# Patient Record
Sex: MALE | Race: WHITE
[De-identification: names, ages, dates, MRNs, and addresses within clinical notes are randomized per-mention and may not be internally consistent; named-entity substitution may affect disease eponyms.]

---

## 2017-06-18 NOTE — EDM.PDOC
ED HPI GENERAL MEDICAL PROBLEM





- General


Stated Complaint: POSSIBLE STROKE


Time Seen by Provider: 06/18/17 10:19


Source of Information: Reports: Patient (partly from patient and partly from 

his wife), Family


History Limitations: Reports: Altered Mental Status





- History of Present Illness


INITIAL COMMENTS - FREE TEXT/NARRATIVE: 





62 yo gentleman with h/o Chronic ETOH abuse and dependency, Chronic smoking hx 

as well as other listed medical problems. Brought to the ER by his wife with h/

o acute confusional state. Wife reports that for the past 3 days, he has been 

drinking heavily. Wife reports that around 4 am when he went to the bathom, 

he apparently Fell down and passed. Wife is unsure what exactly happened to 

him. She heard him Fall and when she went to check on him, he was on the ground 

and was quite confused. He has remained confused since then and seems to be 

getting worse. No obvious fever, chills, SOB, chest pain. No urinary symptoms. 

[place] confusional state.


Onset: Today


Duration: Day(s): (occurred 5 hours ago), Getting Worse


Severity: Severe


Worsens with: Reports: None


Associated Symptoms: Reports: No Other Symptoms





- Related Data


 Allergies











Allergy/AdvReac Type Severity Reaction Status Date / Time


 


morphine Allergy  Difficulty Verified 05/09/16 13:13





   Breathing  


 


nicotine [From Nicoderm CQ] Allergy  Rash Verified 05/09/16 13:13


 


varenicline tartrate Allergy  Hives Verified 05/09/16 13:13





[From Chantix]     











Home Meds: 


 Home Meds





Carvedilol 50 mg PO BID 11/30/13 [History]


Lisinopril/Hydrochlorothiazide [Lisinopril-Hctz 20-12.5 mg Tab] 0.5 each PO 

DAILY 11/30/13 [History]


Nitroglycerin [Nitrostat] 0.4 mg SL ASDIRECTED PRN 11/30/13 [History]


metFORMIN [Glucophage] 1,000 mg PO BIDM 11/30/13 [History]


atorvaSTATin [Lipitor] 20 mg PO DAILY 12/19/14 [History]


Aspirin/Calcium Carbonate/Mag [Aspirin Buffered 325 mg Tab] 325 mg PO DAILY #30 

tablet 12/20/14 [Rx]


Beclomethasone Dipropionate [Qvar 40 Mcg] 2 puff BID PRN 01/26/16 [History]


Furosemide [Lasix] 20 mg PO ASDIRECTED PRN 01/26/16 [History]


Glimepiride [Glimepiride] 4 mg DAILY 01/26/16 [History]


Warfarin [Coumadin] 5 mg PO DAILY 01/26/16 [History]


Clindamycin Hcl [IJP: Clindamycin] 150 mg PO .EVERY 8 HOURS #30 cap 05/09/16 [Rx

]


Isosorbide Mononitrate [Imdur] 30 mg PO DAILY #30 tab.er 05/09/16 [Rx]


Nicotine Polacrilex [Nicorette] 4 mg BUCCAL ASDIRECTED #100 lozenge 05/09/16 [Rx

]


Prednisone [IJD: Prednisone] 10 mg PO BID #30 tab 05/09/16 [Rx]











Past Medical History


HEENT History: Reports: Impaired Vision


Cardiovascular History: Reports: Afib, Heart Failure, High Cholesterol, 

Hypertension, MI, Stents


Other Cardiovascular History: 3 STENTS, MI x 2, cardioversion x 2


Respiratory History: Reports: COPD


Musculoskeletal History: Reports: Back Pain, Chronic


Neurological History: Reports: Neuropathy, Diabetic


Psychiatric History: Reports: Addiction


Other Psychiatric History: hx ETOH abuse, has been hospitalized for ETOH abuse


Endocrine/Metabolic History: Reports: Diabetes, Type II





- Infectious Disease History


Infectious Disease History: Reports: Chicken Pox, Measles





- Past Surgical History


Neurological Surgical History: Reports: Spinal Fusion


Musculoskeletal Surgical History: Reports: Other (See Below)





Social & Family History





- Tobacco Use


Smoking Status *Q: Current Every Day Smoker


Years of Tobacco use: 50


Packs/Tins Daily: 1


Used Tobacco, but Quit: No


Second Hand Smoke Exposure: Yes





- Alcohol Use


Days Per Week of Alcohol Use: 7


Number of Drinks Per Day: 12


Total Drinks Per Week: 84





- Recreational Drug Use


Recreational Drug Use: No





ED ROS GENERAL





- Review of Systems


Review Of Systems: ROS reveals no pertinent complaints other than HPI.





- Physical Exam


Exam: See Below


Exam Limited By: Altered Mental Status


General Appearance: Lethargic, Other (confused.)


Eye Exam: Bilateral Eye: EOMI


Ears: Normal External Exam, Normal Canal, Hearing Grossly Normal, Normal TMs


Nose: Normal Inspection, Normal Mucosa


Throat/Mouth: Normal Inspection, Normal Lips, Normal Teeth, Normal Gums, Normal 

Oropharynx, Normal Voice, No Airway Compromise


Head Exam: Atraumatic, Normocephalic


Neck: Normal Inspection, Supple, Non-Tender


Respiratory/Chest: Other (decreased air entry bilaterally)


Cardiovascular: Normal Peripheral Pulses, Regular Rate, Rhythm, No JVD, No 

Murmur, Other (Bilateral Lower extremity edema)


GI/Abdominal: Normal Bowel Sounds, Soft, Non-Tender, No Organomegaly, No 

Distention, No Abnormal Bruit


Neuro Exam (Abbreviated): Alert, Oriented, CN II-XII Intact, Normal Cognition


Extremities: Other (Bilateral Lower extremity edema)


Skin Exam: Warm, Dry, No Rash





Course





- Vital Signs


Text/Narrative:: 





D/w  Dr. Garcia ([facility]) - who graciously accepted patient. He was 

transported in stable condition via Ambulance to [place]


Last Recorded V/S: 


 Last Vital Signs











Temp  37.0 C   06/18/17 10:10


 


Pulse  88   06/18/17 10:10


 


Resp  18   06/18/17 10:10


 


BP  156/72 H  06/18/17 10:10


 


Pulse Ox  98   06/18/17 10:10














- Orders/Labs/Meds


Orders: 


 Active Orders 24 hr











 Category Date Time Status


 


 EKG Documentation Completion [RC] ASDIRECTED Care  06/18/17 10:27 Active


 


 CXR [Chest 1V Frontal] [CR] Stat Exams  06/18/17 10:23 Ordered


 


 Head wo Cont [CT] Stat Exams  06/18/17 10:20 Taken


 


 Magnesium Sulfate [Magnesium Sulfate 50%] 2 gm Med  06/18/17 11:09 Active





 Dextrose 5% in Water 100 ml   





 IV ONETIME   


 


 Magnesium Sulfate/Water [Magnesium Sulfate 2 GM in Med  06/18/17 11:21 Active





 Water 50 ML] 2 gm   





 Premix Bag 1 bag   





 IV ONETIME   


 


 Sodium Chloride 0.9% [Normal Saline] 1,000 ml Med  06/18/17 10:30 Active





 IV ASDIRECTED   


 


 EKG 12 Lead [EK] Routine Ther  06/18/17 10:26 Ordered








 Medication Orders





Sodium Chloride (Normal Saline)  1,000 mls @ 100 mls/hr IV ASDIRECTED [facility]


   Last Admin: 06/18/17 10:59  Dose: 100 mls/hr


Magnesium Sulfate 2 gm/ (Dextrose/Water)  104 mls @ 100 mls/hr IV ONETIME ONE


   Stop: 06/18/17 12:11


   Last Admin: 06/18/17 11:24  Dose:  


Magnesium Sulfate 2 gm/ Premix  50 mls @ 150 mls/hr IV ONETIME ONE


   Stop: 06/18/17 11:40


   Last Admin: 06/18/17 11:31  Dose: 150 mls/hr








Labs: 


 Laboratory Tests











  06/18/17 06/18/17 06/18/17 Range/Units





  10:20 10:20 10:20 


 


WBC  13.9 H    (4.5-12.0)  X10-3/uL


 


RBC  4.19 L    (4.30-5.75)  x10(6)uL


 


Hgb  13.1    (11.5-15.5)  g/dL


 


Hct  37.9    (30.0-51.3)  %


 


MCV  90.6    (80-96)  fL


 


MCH  31.3    (27.7-33.6)  pg


 


MCHC  34.6    (32.2-35.4)  g/dL


 


RDW  11.9    (11.5-15.5)  %


 


Plt Count  284    (125-369)  X10(3)uL


 


MPV  6.5 L    (7.4-10.4)  fL


 


Neut % (Auto)  85.7 H    (46-82)  %


 


Lymph % (Auto)  5.8 L    (13-37)  %


 


Mono % (Auto)  8.3    (4-12)  %


 


Eos % (Auto)  0 L    (1.0-5.0)  %


 


Baso % (Auto)  0    (0-2)  %


 


Neut # (Auto)  11.9 H    (1.6-8.3)  #


 


Lymph # (Auto)  0.8    (0.6-5.0)  #


 


Mono # (Auto)  1.2    (0.0-1.3)  #


 


Eos # (Auto)  0.0    (0.0-0.8)  #


 


Baso # (Auto)  0.0    (0.0-0.2)  #


 


Sodium   104 L* D   (135-145)  mmol/L


 


Potassium   4.8   (3.5-5.3)  mmol/L


 


Chloride   71 L* D   (100-110)  mmol/L


 


Carbon Dioxide   15 L   (23-29)  mmol/L


 


BUN   8   (8-23)  mg/dL


 


Creatinine   0.7   (0.6-1.3)  mg/dL


 


Est Cr Clr Drug Dosing   115.04   mL/min


 


Estimated GFR (MDRD)   > 60   (>60)  


 


BUN/Creatinine Ratio   11.4   (9-20)  


 


Glucose   214 H   ()  mg/dL


 


Calcium   8.0 L   (8.6-10.2)  mg/dL


 


Magnesium     (1.8-2.5)  mg/dL


 


Total Bilirubin   2.3 H   (0.1-1.3)  mg/dL


 


AST   34 H   (5-27)  IU/L


 


ALT   23  D   (14-26)  IU/L


 


Alkaline Phosphatase   62   ()  IU/L


 


Troponin I     (0.02-0.06)  NG/ML


 


B-Natriuretic Peptide     (0-100)  pg/mL


 


Total Protein   7.1   (6.0-8.0)  g/dL


 


Albumin   4.5   (3.2-4.6)  g/dL


 


Globulin   2.6   g/dL


 


Albumin/Globulin Ratio   1.7   


 


TSH, Ultra Sensitive     (0.4-5.5)  nlU/mL


 


Urine Color     (YELLOW)  


 


Urine Appearance     (CLEAR)  


 


Urine pH     (5.0-6.5)  


 


Ur Specific Gravity     (1.010-1.025)  


 


Urine Protein     (NEGATIVE)  mg/dL


 


Urine Glucose (UA)     (NEGATIVE)  mg/dL


 


Urine Ketones     (NEGATIVE)  mg/dL


 


Urine Occult Blood     (NEGATIVE)  


 


Urine Nitrite     (NEGATIVE)  


 


Urine Bilirubin     (NEGATIVE)  


 


Urine Urobilinogen     (NEGATIVE)  mg/dL


 


Ur Leukocyte Esterase     (NEGATIVE)  


 


Urine Opiates Screen     (NEGATIVE)  


 


Ur Oxycodone Screen     (NEGATIVE)  


 


Ur Propoxyphene Screen     (NEGATIVE)  


 


Ur Barbituates Screen     (NEGATIVE)  


 


Ur Tricyclics Screen     (NEGATIVE)  


 


Ur Phencyclidine Scrn     (NEGATIVE)  


 


Ur Amphetamine Screen     (NEGATIVE)  


 


Urine MDMA Screen     (NEGATIVE)  


 


U Benzodiazepines Scrn     (NEGATIVE)  


 


U Cocaine Metab Screen     (NEGATIVE)  


 


U Marijuana (THC) Screen     (NEGATIVE)  


 


Ethyl Alcohol    < 0.01  (<0.01)  %














  06/18/17 06/18/17 06/18/17 Range/Units





  10:20 10:20 10:20 


 


WBC     (4.5-12.0)  X10-3/uL


 


RBC     (4.30-5.75)  x10(6)uL


 


Hgb     (11.5-15.5)  g/dL


 


Hct     (30.0-51.3)  %


 


MCV     (80-96)  fL


 


MCH     (27.7-33.6)  pg


 


MCHC     (32.2-35.4)  g/dL


 


RDW     (11.5-15.5)  %


 


Plt Count     (125-369)  X10(3)uL


 


MPV     (7.4-10.4)  fL


 


Neut % (Auto)     (46-82)  %


 


Lymph % (Auto)     (13-37)  %


 


Mono % (Auto)     (4-12)  %


 


Eos % (Auto)     (1.0-5.0)  %


 


Baso % (Auto)     (0-2)  %


 


Neut # (Auto)     (1.6-8.3)  #


 


Lymph # (Auto)     (0.6-5.0)  #


 


Mono # (Auto)     (0.0-1.3)  #


 


Eos # (Auto)     (0.0-0.8)  #


 


Baso # (Auto)     (0.0-0.2)  #


 


Sodium     (135-145)  mmol/L


 


Potassium     (3.5-5.3)  mmol/L


 


Chloride     (100-110)  mmol/L


 


Carbon Dioxide     (23-29)  mmol/L


 


BUN     (8-23)  mg/dL


 


Creatinine     (0.6-1.3)  mg/dL


 


Est Cr Clr Drug Dosing     mL/min


 


Estimated GFR (MDRD)     (>60)  


 


BUN/Creatinine Ratio     (9-20)  


 


Glucose     ()  mg/dL


 


Calcium     (8.6-10.2)  mg/dL


 


Magnesium  1.6 L    (1.8-2.5)  mg/dL


 


Total Bilirubin     (0.1-1.3)  mg/dL


 


AST     (5-27)  IU/L


 


ALT     (14-26)  IU/L


 


Alkaline Phosphatase     ()  IU/L


 


Troponin I   < 0.01 L   (0.02-0.06)  NG/ML


 


B-Natriuretic Peptide     (0-100)  pg/mL


 


Total Protein     (6.0-8.0)  g/dL


 


Albumin     (3.2-4.6)  g/dL


 


Globulin     g/dL


 


Albumin/Globulin Ratio     


 


TSH, Ultra Sensitive    0.42  (0.4-5.5)  nlU/mL


 


Urine Color     (YELLOW)  


 


Urine Appearance     (CLEAR)  


 


Urine pH     (5.0-6.5)  


 


Ur Specific Gravity     (1.010-1.025)  


 


Urine Protein     (NEGATIVE)  mg/dL


 


Urine Glucose (UA)     (NEGATIVE)  mg/dL


 


Urine Ketones     (NEGATIVE)  mg/dL


 


Urine Occult Blood     (NEGATIVE)  


 


Urine Nitrite     (NEGATIVE)  


 


Urine Bilirubin     (NEGATIVE)  


 


Urine Urobilinogen     (NEGATIVE)  mg/dL


 


Ur Leukocyte Esterase     (NEGATIVE)  


 


Urine Opiates Screen     (NEGATIVE)  


 


Ur Oxycodone Screen     (NEGATIVE)  


 


Ur Propoxyphene Screen     (NEGATIVE)  


 


Ur Barbituates Screen     (NEGATIVE)  


 


Ur Tricyclics Screen     (NEGATIVE)  


 


Ur Phencyclidine Scrn     (NEGATIVE)  


 


Ur Amphetamine Screen     (NEGATIVE)  


 


Urine MDMA Screen     (NEGATIVE)  


 


U Benzodiazepines Scrn     (NEGATIVE)  


 


U Cocaine Metab Screen     (NEGATIVE)  


 


U Marijuana (THC) Screen     (NEGATIVE)  


 


Ethyl Alcohol     (<0.01)  %














  06/18/17 06/18/17 06/18/17 Range/Units





  10:20 11:09 11:09 


 


WBC     (4.5-12.0)  X10-3/uL


 


RBC     (4.30-5.75)  x10(6)uL


 


Hgb     (11.5-15.5)  g/dL


 


Hct     (30.0-51.3)  %


 


MCV     (80-96)  fL


 


MCH     (27.7-33.6)  pg


 


MCHC     (32.2-35.4)  g/dL


 


RDW     (11.5-15.5)  %


 


Plt Count     (125-369)  X10(3)uL


 


MPV     (7.4-10.4)  fL


 


Neut % (Auto)     (46-82)  %


 


Lymph % (Auto)     (13-37)  %


 


Mono % (Auto)     (4-12)  %


 


Eos % (Auto)     (1.0-5.0)  %


 


Baso % (Auto)     (0-2)  %


 


Neut # (Auto)     (1.6-8.3)  #


 


Lymph # (Auto)     (0.6-5.0)  #


 


Mono # (Auto)     (0.0-1.3)  #


 


Eos # (Auto)     (0.0-0.8)  #


 


Baso # (Auto)     (0.0-0.2)  #


 


Sodium     (135-145)  mmol/L


 


Potassium     (3.5-5.3)  mmol/L


 


Chloride     (100-110)  mmol/L


 


Carbon Dioxide     (23-29)  mmol/L


 


BUN     (8-23)  mg/dL


 


Creatinine     (0.6-1.3)  mg/dL


 


Est Cr Clr Drug Dosing     mL/min


 


Estimated GFR (MDRD)     (>60)  


 


BUN/Creatinine Ratio     (9-20)  


 


Glucose     ()  mg/dL


 


Calcium     (8.6-10.2)  mg/dL


 


Magnesium     (1.8-2.5)  mg/dL


 


Total Bilirubin     (0.1-1.3)  mg/dL


 


AST     (5-27)  IU/L


 


ALT     (14-26)  IU/L


 


Alkaline Phosphatase     ()  IU/L


 


Troponin I     (0.02-0.06)  NG/ML


 


B-Natriuretic Peptide  107 H    (0-100)  pg/mL


 


Total Protein     (6.0-8.0)  g/dL


 


Albumin     (3.2-4.6)  g/dL


 


Globulin     g/dL


 


Albumin/Globulin Ratio     


 


TSH, Ultra Sensitive     (0.4-5.5)  nlU/mL


 


Urine Color    Yellow  (YELLOW)  


 


Urine Appearance    Clear  (CLEAR)  


 


Urine pH    5.0  (5.0-6.5)  


 


Ur Specific Gravity    1.020  (1.010-1.025)  


 


Urine Protein    Trace  (NEGATIVE)  mg/dL


 


Urine Glucose (UA)    >1000 H  (NEGATIVE)  mg/dL


 


Urine Ketones    150 H  (NEGATIVE)  mg/dL


 


Urine Occult Blood    Moderate H  (NEGATIVE)  


 


Urine Nitrite    Negative  (NEGATIVE)  


 


Urine Bilirubin    Negative  (NEGATIVE)  


 


Urine Urobilinogen    Normal  (NEGATIVE)  mg/dL


 


Ur Leukocyte Esterase    Negative  (NEGATIVE)  


 


Urine Opiates Screen   Negative   (NEGATIVE)  


 


Ur Oxycodone Screen   Negative   (NEGATIVE)  


 


Ur Propoxyphene Screen   Negative   (NEGATIVE)  


 


Ur Barbituates Screen   Negative   (NEGATIVE)  


 


Ur Tricyclics Screen   Negative   (NEGATIVE)  


 


Ur Phencyclidine Scrn   Negative   (NEGATIVE)  


 


Ur Amphetamine Screen   Negative   (NEGATIVE)  


 


Urine MDMA Screen   Negative   (NEGATIVE)  


 


U Benzodiazepines Scrn   Positive H   (NEGATIVE)  


 


U Cocaine Metab Screen   Negative   (NEGATIVE)  


 


U Marijuana (THC) Screen   Negative   (NEGATIVE)  


 


Ethyl Alcohol     (<0.01)  %











Meds: 


Medications











Generic Name Dose Route Start Last Admin





  Trade Name Freq  PRN Reason Stop Dose Admin


 


Sodium Chloride  1,000 mls @ 100 mls/hr  06/18/17 10:30  06/18/17 10:59





  Normal Saline  IV   100 mls/hr





  ASDIRECTED WILIAN   Administration


 


Magnesium Sulfate 2 gm/  104 mls @ 100 mls/hr  06/18/17 11:09  06/18/17 11:24





  Dextrose/Water  IV  06/18/17 12:11  Not Given





  ONETIME ONE   


 


Magnesium Sulfate 2 gm/ Premix  50 mls @ 150 mls/hr  06/18/17 11:21  06/18/17 11

:31





  IV  06/18/17 11:40  150 mls/hr





  ONETIME ONE   Administration














Discontinued Medications














Generic Name Dose Route Start Last Admin





  Trade Name Shanika  PRN Reason Stop Dose Admin


 


Magnesium Sulfate  Confirm  06/18/17 11:18  06/18/17 11:24





  Magnesium Sulfate 2 Gm In Water 50 Ml  Administered  06/18/17 11:19  Not Given





  Dose   





  50 mls @ as directed   





  .ROUTE   





  .STK-MED ONE   














Departure





- Departure


Time of Disposition: 11:41


Disposition: DC/Tfer to Other 70


Condition: Fair


Clinical Impression: 


 Acute hyponatremia








- Discharge Information


Instructions:  Hyponatremia


Referrals: 


Wilson Burnette MD [Primary Care Provider] - 


Forms:  ED Department Discharge


Additional Instructions: 


Transferred to 





- My Orders


Last 24 Hours: 


My Active Orders





06/18/17 10:20


Head wo Cont [CT] Stat 





06/18/17 10:23


CXR [Chest 1V Frontal] [CR] Stat 





06/18/17 10:26


EKG 12 Lead [EK] Routine 





06/18/17 10:27


EKG Documentation Completion [RC] ASDIRECTED 





06/18/17 10:30


Sodium Chloride 0.9% [Normal Saline] 1,000 ml IV ASDIRECTED 





06/18/17 11:09


Magnesium Sulfate [Magnesium Sulfate 50%] 2 gm   Dextrose 5% in Water 100 ml IV 

ONETIME 





06/18/17 11:21


Magnesium Sulfate/Water [Magnesium Sulfate 2 GM in Water 50 ML] 2 gm   Premix 

Bag 1 bag IV ONETIME 














- Assessment/Plan


Last 24 Hours: 


My Active Orders





06/18/17 10:20


Head wo Cont [CT] Stat 





06/18/17 10:23


CXR [Chest 1V Frontal] [CR] Stat 





06/18/17 10:26


EKG 12 Lead [EK] Routine 





06/18/17 10:27


EKG Documentation Completion [RC] ASDIRECTED 





06/18/17 10:30


Sodium Chloride 0.9% [Normal Saline] 1,000 ml IV ASDIRECTED 





06/18/17 11:09


Magnesium Sulfate [Magnesium Sulfate 50%] 2 gm   Dextrose 5% in Water 100 ml IV 

ONETIME 





06/18/17 11:21


Magnesium Sulfate/Water [Magnesium Sulfate 2 GM in Water 50 ML] 2 gm   Premix 

Bag 1 bag IV ONETIME

## 2017-06-22 NOTE — CR
INDICATION:  Altered mental status.  



CHEST, AP VIEW: 



FINDINGS:  There is an earlier chest x-ray of 05/09/2016 for correlation. 



I do not necessarily see an interval change.  The lungs and pleural spaces are 
clear.  The heart size is at the upper limits of normal.  There is no CHF.  For 
the AP technique, I feel the mediastinal contours are normal.  There is mild 
scoliosis deformity mid thoracic spine curved convex left, as before.  There is 
some superimposed degenerative endplate change and spurring.  



IMPRESSION:  No acute abnormality in the chest.  Specifically, no CHF or 
pneumonia.  
MTDD

## 2019-10-13 ENCOUNTER — HOSPITAL ENCOUNTER (EMERGENCY)
Dept: HOSPITAL 7 - FB.ED | Age: 64
LOS: 1 days | Discharge: NURSING, REHAB OR LONG-TERM CARE | End: 2019-10-14
Payer: MEDICARE

## 2019-10-13 DIAGNOSIS — Z91.048: ICD-10-CM

## 2019-10-13 DIAGNOSIS — Z79.899: ICD-10-CM

## 2019-10-13 DIAGNOSIS — I50.9: ICD-10-CM

## 2019-10-13 DIAGNOSIS — Z79.4: ICD-10-CM

## 2019-10-13 DIAGNOSIS — J44.9: ICD-10-CM

## 2019-10-13 DIAGNOSIS — J96.01: Primary | ICD-10-CM

## 2019-10-13 DIAGNOSIS — Z79.51: ICD-10-CM

## 2019-10-13 DIAGNOSIS — I25.2: ICD-10-CM

## 2019-10-13 DIAGNOSIS — Z88.5: ICD-10-CM

## 2019-10-13 DIAGNOSIS — F17.200: ICD-10-CM

## 2019-10-13 DIAGNOSIS — R79.1: ICD-10-CM

## 2019-10-13 DIAGNOSIS — I11.0: ICD-10-CM

## 2019-10-13 DIAGNOSIS — Z79.01: ICD-10-CM

## 2019-10-13 DIAGNOSIS — Z95.5: ICD-10-CM

## 2019-10-13 DIAGNOSIS — Z79.82: ICD-10-CM

## 2019-10-13 DIAGNOSIS — E11.40: ICD-10-CM

## 2019-10-13 DIAGNOSIS — I48.19: ICD-10-CM

## 2019-10-13 DIAGNOSIS — E78.00: ICD-10-CM

## 2019-10-13 PROCEDURE — 85610 PROTHROMBIN TIME: CPT

## 2019-10-13 PROCEDURE — 83880 ASSAY OF NATRIURETIC PEPTIDE: CPT

## 2019-10-13 PROCEDURE — 36415 COLL VENOUS BLD VENIPUNCTURE: CPT

## 2019-10-13 PROCEDURE — 85025 COMPLETE CBC W/AUTO DIFF WBC: CPT

## 2019-10-13 PROCEDURE — 82803 BLOOD GASES ANY COMBINATION: CPT

## 2019-10-13 PROCEDURE — 99285 EMERGENCY DEPT VISIT HI MDM: CPT

## 2019-10-13 PROCEDURE — 93005 ELECTROCARDIOGRAM TRACING: CPT

## 2019-10-13 PROCEDURE — 80053 COMPREHEN METABOLIC PANEL: CPT

## 2019-10-13 PROCEDURE — 86140 C-REACTIVE PROTEIN: CPT

## 2019-10-13 PROCEDURE — 96374 THER/PROPH/DIAG INJ IV PUSH: CPT

## 2019-10-13 PROCEDURE — 71045 X-RAY EXAM CHEST 1 VIEW: CPT

## 2019-10-13 PROCEDURE — 85379 FIBRIN DEGRADATION QUANT: CPT

## 2019-10-13 PROCEDURE — 83735 ASSAY OF MAGNESIUM: CPT

## 2019-10-13 PROCEDURE — 84484 ASSAY OF TROPONIN QUANT: CPT

## 2019-10-13 PROCEDURE — 94640 AIRWAY INHALATION TREATMENT: CPT

## 2019-10-14 VITALS — DIASTOLIC BLOOD PRESSURE: 91 MMHG | SYSTOLIC BLOOD PRESSURE: 167 MMHG | HEART RATE: 96 BPM

## 2019-10-14 NOTE — EDM.PDOC
ED HPI GENERAL MEDICAL PROBLEM





- General


Stated Complaint: SHORT OF BREATH; LEGS SWELLING


Time Seen by Provider: 10/14/19 00:04


Source of Information: Reports: Patient


History Limitations: Reports: No Limitations





- History of Present Illness


INITIAL COMMENTS - FREE TEXT/NARRATIVE: 





64-year-old male who reports the beginning Friday evening he noticed that he 

was having some more wreathing with shortness of breath at rest while watching 

TV. He also began to notice that his feet had increased swelling. The symptoms 

seem to be present all day long yesterday and then this morning because his 

feet were still swollen and his breathing was somewhat worse than the day before

, he took 20 mg of Lasix by mouth. This is appear in medication that he has on 

hand and he has not had to take it for quite some time. The medication is 

current as he keeps it fresh he states. He had quite a bit of urine output 

today he feels and it seemed to be improving but the swelling in his feet seem 

to be worse tonight and at 10:30 PM he was having increased difficulty 

breathing and that caused him to present here for evaluation. He did not feel 

he can catch his breath tonight. He had no chest pain associated with this. 

There is a burning-type pressure sensation in both of his feet and lower legs 

that has been worsening now they're swollen. He rates that pain is about an 8/

10. He does have some chronic neuropathy but this is worse. No nausea or 

vomiting. No fevers or chills. No new cough. No phlegm production. He has been 

taking PO well today. No arm pain, back pain or jaw pain. When he showed appear 

his O2 saturations were 87% on room air and they bumped up to 95% on 1 L/m via 

nasal cannula. He is on no chronic oxygen therapy. There are no other 

associated signs or symptoms. There are no other modifying factors.


Onset: Other (Friday night, 10/11/2019.)


Duration: Getting Worse


Location: Reports: Lower Extremity, Left, Lower Extremity, Right


Quality: Reports: Burning, Pressure, Other (And shortness of breath, that is 

his major complaint when presenting tonight)


Severity: Moderate


Improves with: Reports: None


Worsens with: Reports: None


Context: Reports: Other (As above)


Associated Symptoms: Reports: No Other Symptoms (Just the shortness of breath)


Treatments PTA: Reports: Other (see below) (Lasix 20 mg at 10:30 AM on 10/13/

2019.)





- Related Data


 Allergies











Allergy/AdvReac Type Severity Reaction Status Date / Time


 


morphine Allergy  Difficulty Verified 05/09/16 13:13





   Breathing  


 


nicotine [From Nicoderm CQ] Allergy  Rash Verified 05/09/16 13:13


 


varenicline tartrate Allergy  Hives Verified 05/09/16 13:13





[From Chantix]     











Home Meds: 


 Home Meds





Carvedilol 25 mg PO BID 11/30/13 [History]


Lisinopril/Hydrochlorothiazide [Lisinopril-Hctz 20-12.5 mg Tab] 1 tab PO DAILY 

11/30/13 [History]


Nitroglycerin [Nitrostat] 0.4 mg SL ASDIRECTED PRN 11/30/13 [History]


metFORMIN [Glucophage] 1,000 mg PO BIDM 11/30/13 [History]


atorvaSTATin [Lipitor] 20 mg PO DAILY 12/19/14 [History]


Furosemide [Lasix] 20 mg PO ASDIRECTED PRN 01/26/16 [History]


Glimepiride 4 mg DAILY 01/26/16 [History]


Warfarin [Coumadin] 5 mg PO SUTUTHSA 01/26/16 [History]


Isosorbide Mononitrate [Imdur] 30 mg PO DAILY #30 tab.er 05/09/16 [Rx]


.Vitamin K-1 1 tab PO DAILY 10/14/19 [History]


Aspirin [Ecotrin EC] 81 mg PO DAILY 10/14/19 [History]


Docusate Sodium [Stool Softener] 100 mg PO DAILY 10/14/19 [History]


Fluticasone/Vilanterol [Breo Ellipta 100-25 MCG Inhalation Kit] 1 puff INH 

DAILY 10/14/19 [History]


Insulin Degludec [Tresiba Flextouch U-100] 15 unit SQ DAILY 10/14/19 [History]


Multivit-Min/FA/Lycopene/Lut [Centrum Silver Tablet] 1 each PO DAILY 10/14/19 [

History]


Warfarin [Coumadin] 5 mg PO MOWEFR 10/14/19 [History]











Past Medical History


HEENT History: Reports: Impaired Vision


Cardiovascular History: Reports: Afib, Heart Failure, High Cholesterol, 

Hypertension, MI, Stents


Other Cardiovascular History: 3 STENTS, MI x 2, cardioversion x 2


Respiratory History: Reports: COPD


Musculoskeletal History: Reports: Back Pain, Chronic


Neurological History: Reports: Neuropathy, Diabetic


Psychiatric History: Reports: Addiction


Other Psychiatric History: hx ETOH abuse, has been hospitalized for ETOH abuse


Endocrine/Metabolic History: Reports: Diabetes, Type II





- Infectious Disease History


Infectious Disease History: Reports: Chicken Pox, Measles





- Past Surgical History


Cardiovascular Surgical History: Reports: Coronary Artery Stent, Percutaneous 

Transluminal Angioplasty (3)


Neurological Surgical History: Reports: Spinal Fusion, Other (See Below) (Back 

surgery 2. Left sciatic nerve surgery.)


Musculoskeletal Surgical History: Reports: Other (See Below)





Social & Family History





- Family History


Family Medical History: Noncontributory





- Tobacco Use


Smoking Status *Q: Current Every Day Smoker





- Caffeine Use


Caffeine Use: Reports: Coffee





- Alcohol Use


Alcohol Use History: Yes


Alcohol Use in Last Twelve Months: No


Alcohol Use Comment: History of alcoholism in the past but apparently has been 

sober for some time





- Living Situation & Occupation


Living situation: Reports: , with Spouse


Occupation: Retired (Retired )





ED ROS GENERAL





- Review of Systems


Review Of Systems: See Below


Constitutional: Reports: No Symptoms


HEENT: Reports: No Symptoms


Respiratory: Reports: Shortness of Breath


Cardiovascular: Reports: Dyspnea on Exertion, Edema, Orthopnea.  Denies: Chest 

Pain


GI/Abdominal: Reports: No Symptoms


: Reports: No Symptoms


Musculoskeletal: Reports: Leg Pain (Burning pain bilaterally)


Skin: Reports: No Symptoms


Neurological: Reports: No Symptoms


Hematologic/Lymphatic: Reports: Easy Bleeding, Easy Bruising, Other (Patient is 

chronically anticoagulated with Coumadin.)


Immunologic: Reports: Other (Got an influenza vaccine on Friday of this last 

week)





ED EXAM, GENERAL





- Physical Exam


Exam: See Below


Exam Limited By: No Limitations


General Appearance: Alert, WD/WN, Moderate Distress (Appear somewhat dyspneic)


Eye Exam: Bilateral Eye: EOMI, Normal Inspection, PERRL


Ears: Normal External Exam, Hearing Grossly Normal


Ear Exam: Bilateral Ear: Auricle Normal


Nose: Normal Inspection, Normal Mucosa, No Blood


Throat/Mouth: Normal Inspection, Normal Lips, Normal Oropharynx, Normal Voice, 

No Airway Compromise


Head: Atraumatic, Normocephalic


Neck: Normal Inspection, Supple, Non-Tender, Full Range of Motion


Respiratory/Chest: Chest Non-Tender, Respiratory Distress, Rales, Wheezing, 

Accessory Muscle Use


Cardiovascular: Normal Peripheral Pulses, No Murmur, JVD, Irregularly Irregular


Peripheral Pulses: 2+: Radial (L), Radial (R), Dorsalis Pedis (L), Dorsalis 

Pedis (R)


GI/Abdominal: Normal Bowel Sounds, Soft, Non-Tender, No Mass


Back Exam: Normal Inspection, Full Range of Motion


Extremities: Normal Capillary Refill, Pedal Edema


Neurological: Alert, Oriented, CN II-XII Intact, Normal Cognition, No Motor/

Sensory Deficits


Skin Exam: Warm, Dry, Intact, Normal Color, No Rash





EKG INTERPRETATION


EKG Date: 10/14/19


Time: 00:25


Rhythm: A-Fib


Rate (Beats/Min): 101


Axis: Normal


P-Wave: Absent


QRS: Wide


ST-T: Other (Nonspecific changes with poor R-wave progression)


QT: Normal


Comparison: No Change (From EKG performed on 6/18/2017.)





Course





- Orders/Labs/Meds


Orders: 


 Active Orders 24 hr











 Category Date Time Status


 


 EKG Documentation Completion [RC] ASDIRECTED Care  10/14/19 00:23 Active


 


 RT Aerosol Therapy [RC] ASDIRECTED Care  10/14/19 00:24 Active


 


 Chest 1V Frontal [CR] Stat Exams  10/14/19 00:22 Ordered


 


 Sodium Chloride 0.9% [Saline Flush] Med  10/14/19 00:22 Active





 10 ml FLUSH ASDIRECTED PRN   


 


 Peripheral IV Insertion Adult [OM.PC] Routine Oth  10/14/19 00:22 Ordered


 


 EKG 12 Lead [EK] Routine Ther  10/14/19 00:22 Ordered








 Medication Orders





Sodium Chloride (Saline Flush)  10 ml FLUSH ASDIRECTED PRN


   PRN Reason: Keep Vein Open


   Last Admin: 10/14/19 00:50  Dose: 10 ml








Labs: 


 Laboratory Tests











  10/14/19 10/14/19 10/14/19 Range/Units





  00:50 00:50 00:50 


 


WBC  10.2    (4.5-12.0)  X10-3/uL


 


RBC  4.42    (4.30-5.75)  x10(6)uL


 


Hgb  13.8    (13.5-17.8)  g/dL


 


Hct  40.5    (30.0-51.3)  %


 


MCV  91.6    (80-96)  fL


 


MCH  31.1    (27.7-33.6)  pg


 


MCHC  34.0    (32.2-35.4)  g/dL


 


RDW  12.7    (11.5-15.5)  %


 


Plt Count  236    (125-369)  X10(3)uL


 


MPV  6.9 L    (7.4-10.4)  fL


 


Neut % (Auto)  79.3    (46-82)  %


 


Lymph % (Auto)  10.2 L    (13-37)  %


 


Mono % (Auto)  9.6    (4-12)  %


 


Eos % (Auto)  1    (1.0-5.0)  %


 


Baso % (Auto)  0    (0-2)  %


 


Neut # (Auto)  8.1    (1.6-8.3)  #


 


Lymph # (Auto)  1.0    (0.6-5.0)  #


 


Mono # (Auto)  1.0    (0.0-1.3)  #


 


Eos # (Auto)  0.1    (0.0-0.8)  #


 


Baso # (Auto)  0.0    (0.0-0.2)  #


 


PT   52.9 H*   (8.7-11.1)  


 


INR   4.93 H*   (0.89-1.13)  


 


D-Dimer, Quantitative   < 0.19   (0.0-0.59)  mg/LFEU


 


POC VBG pH     (7.31-7.41)  


 


POC VBG pCO2     (41-51)  mmHG


 


POC VBG HCO3     (23-28)  mmol/L


 


POC VBG Total CO2     (24-29)  mmol/L


 


POC VBG Base Excess     (-2-3)  mmol/L


 


Sodium    136  (135-145)  mmol/L


 


Potassium    3.4 L  (3.5-5.3)  mmol/L


 


Chloride    97 L  (100-110)  mmol/L


 


Carbon Dioxide    31  (21-32)  mmol/L


 


BUN    3 L  (7-18)  mg/dL


 


Creatinine    0.5 L  (0.70-1.30)  mg/dL


 


Est Cr Clr Drug Dosing    154.78  mL/min


 


Estimated GFR (MDRD)    > 60  (>60)  


 


BUN/Creatinine Ratio    6.0 L  (9-20)  


 


Glucose    153 H  ()  mg/dL


 


Calcium    8.6  (8.6-10.2)  mg/dL


 


Magnesium     (1.8-2.5)  mg/dL


 


Total Bilirubin    0.6  (0.1-1.3)  mg/dL


 


AST    21  (5-25)  IU/L


 


ALT    40 H  (12-36)  U/L


 


Alkaline Phosphatase    88  ()  IU/L


 


Troponin I     (<0.017-0.056)  ng/mL


 


C-Reactive Protein     (0.5-0.9)  mg/dL


 


NT-Pro-B Natriuret Pep     (<=125)  pg/mL


 


Total Protein    6.6  (6.0-8.0)  g/dL


 


Albumin    3.2  (3.2-4.6)  g/dL


 


Globulin    3.4  g/dL


 


Albumin/Globulin Ratio    0.9  














  10/14/19 10/14/19 10/14/19 Range/Units





  00:50 00:50 00:50 


 


WBC     (4.5-12.0)  X10-3/uL


 


RBC     (4.30-5.75)  x10(6)uL


 


Hgb     (13.5-17.8)  g/dL


 


Hct     (30.0-51.3)  %


 


MCV     (80-96)  fL


 


MCH     (27.7-33.6)  pg


 


MCHC     (32.2-35.4)  g/dL


 


RDW     (11.5-15.5)  %


 


Plt Count     (125-369)  X10(3)uL


 


MPV     (7.4-10.4)  fL


 


Neut % (Auto)     (46-82)  %


 


Lymph % (Auto)     (13-37)  %


 


Mono % (Auto)     (4-12)  %


 


Eos % (Auto)     (1.0-5.0)  %


 


Baso % (Auto)     (0-2)  %


 


Neut # (Auto)     (1.6-8.3)  #


 


Lymph # (Auto)     (0.6-5.0)  #


 


Mono # (Auto)     (0.0-1.3)  #


 


Eos # (Auto)     (0.0-0.8)  #


 


Baso # (Auto)     (0.0-0.2)  #


 


PT     (8.7-11.1)  


 


INR     (0.89-1.13)  


 


D-Dimer, Quantitative     (0.0-0.59)  mg/LFEU


 


POC VBG pH     (7.31-7.41)  


 


POC VBG pCO2     (41-51)  mmHG


 


POC VBG HCO3     (23-28)  mmol/L


 


POC VBG Total CO2     (24-29)  mmol/L


 


POC VBG Base Excess     (-2-3)  mmol/L


 


Sodium     (135-145)  mmol/L


 


Potassium     (3.5-5.3)  mmol/L


 


Chloride     (100-110)  mmol/L


 


Carbon Dioxide     (21-32)  mmol/L


 


BUN     (7-18)  mg/dL


 


Creatinine     (0.70-1.30)  mg/dL


 


Est Cr Clr Drug Dosing     mL/min


 


Estimated GFR (MDRD)     (>60)  


 


BUN/Creatinine Ratio     (9-20)  


 


Glucose     ()  mg/dL


 


Calcium     (8.6-10.2)  mg/dL


 


Magnesium   1.6 L   (1.8-2.5)  mg/dL


 


Total Bilirubin     (0.1-1.3)  mg/dL


 


AST     (5-25)  IU/L


 


ALT     (12-36)  U/L


 


Alkaline Phosphatase     ()  IU/L


 


Troponin I  < 0.017 L    (<0.017-0.056)  ng/mL


 


C-Reactive Protein  7.0 H*    (0.5-0.9)  mg/dL


 


NT-Pro-B Natriuret Pep    750 H  (<=125)  pg/mL


 


Total Protein     (6.0-8.0)  g/dL


 


Albumin     (3.2-4.6)  g/dL


 


Globulin     g/dL


 


Albumin/Globulin Ratio     














  10/14/19 Range/Units





  00:50 


 


WBC   (4.5-12.0)  X10-3/uL


 


RBC   (4.30-5.75)  x10(6)uL


 


Hgb   (13.5-17.8)  g/dL


 


Hct   (30.0-51.3)  %


 


MCV   (80-96)  fL


 


MCH   (27.7-33.6)  pg


 


MCHC   (32.2-35.4)  g/dL


 


RDW   (11.5-15.5)  %


 


Plt Count   (125-369)  X10(3)uL


 


MPV   (7.4-10.4)  fL


 


Neut % (Auto)   (46-82)  %


 


Lymph % (Auto)   (13-37)  %


 


Mono % (Auto)   (4-12)  %


 


Eos % (Auto)   (1.0-5.0)  %


 


Baso % (Auto)   (0-2)  %


 


Neut # (Auto)   (1.6-8.3)  #


 


Lymph # (Auto)   (0.6-5.0)  #


 


Mono # (Auto)   (0.0-1.3)  #


 


Eos # (Auto)   (0.0-0.8)  #


 


Baso # (Auto)   (0.0-0.2)  #


 


PT   (8.7-11.1)  


 


INR   (0.89-1.13)  


 


D-Dimer, Quantitative   (0.0-0.59)  mg/LFEU


 


POC VBG pH  7.40  (7.31-7.41)  


 


POC VBG pCO2  47.3  (41-51)  mmHG


 


POC VBG HCO3  29.2 H  (23-28)  mmol/L


 


POC VBG Total CO2  31 H  (24-29)  mmol/L


 


POC VBG Base Excess  4 H  (-2-3)  mmol/L


 


Sodium   (135-145)  mmol/L


 


Potassium   (3.5-5.3)  mmol/L


 


Chloride   (100-110)  mmol/L


 


Carbon Dioxide   (21-32)  mmol/L


 


BUN   (7-18)  mg/dL


 


Creatinine   (0.70-1.30)  mg/dL


 


Est Cr Clr Drug Dosing   mL/min


 


Estimated GFR (MDRD)   (>60)  


 


BUN/Creatinine Ratio   (9-20)  


 


Glucose   ()  mg/dL


 


Calcium   (8.6-10.2)  mg/dL


 


Magnesium   (1.8-2.5)  mg/dL


 


Total Bilirubin   (0.1-1.3)  mg/dL


 


AST   (5-25)  IU/L


 


ALT   (12-36)  U/L


 


Alkaline Phosphatase   ()  IU/L


 


Troponin I   (<0.017-0.056)  ng/mL


 


C-Reactive Protein   (0.5-0.9)  mg/dL


 


NT-Pro-B Natriuret Pep   (<=125)  pg/mL


 


Total Protein   (6.0-8.0)  g/dL


 


Albumin   (3.2-4.6)  g/dL


 


Globulin   g/dL


 


Albumin/Globulin Ratio   











Meds: 


Medications











Generic Name Dose Route Start Last Admin





  Trade Name Freq  PRN Reason Stop Dose Admin


 


Sodium Chloride  10 ml  10/14/19 00:22  10/14/19 00:50





  Saline Flush  FLUSH   10 ml





  ASDIRECTED PRN   Administration





  Keep Vein Open   





     





     





     














Discontinued Medications














Generic Name Dose Route Start Last Admin





  Trade Name Eliasq  PRN Reason Stop Dose Admin


 


Albuterol/Ipratropium  3 ml  10/14/19 00:24  10/14/19 00:45





  Duoneb 3.0-0.5 Mg/3 Ml  NEB  10/14/19 00:25  3 ml





  ONETIME ONE   Administration





     





     





     





     


 


Furosemide  40 mg  10/14/19 00:24  10/14/19 00:46





  Lasix  IVPUSH  10/14/19 00:25  40 mg





  NOW ONE   Administration





     





     





     





     














- Radiology Interpretation


Free Text/Narrative:: 





Portable chest x-ray shows evidence of COPD but also evidence of decompensated 

CHF.





- Re-Assessments/Exams


Free Text/Narrative Re-Assessment/Exam: 





10/14/19 01:30: Patient remains stable with O2 saturations of 93-94% on 1 L per 

min via nasal cannula. He is having a good diuresis from the 40 mg of Lasix 

that was given. His chest x-ray shows evidence of CHF. His hemoglobin is 

normal. His electrolytes are essentially normal. I am awaiting his troponin, d-

dimer, INR and proBNP. The patient does have decompensated CHF with respiratory 

failure and will need admission IV diuresis. There are no beds available at the 

[facility] and the patient will therefore need to be transferred to a 

facility with a medical/telemetry bed available. The patient is followed 

through the [facility] and would want me to discuss this case with the 

doctors at [facility] in [place].





10/14/19 02:00: The patient's troponin is back and is negative. D-dimer is 

normal. His proBNP is somewhat elevated. INR is supratherapeutic at 4.93. I 

discussed the patient's case with Dr. Quiroga, hospitalist at [facility] in [place]

, and he has agreed to accept the patient in transfer. The patient will be 

transferred via ambulance to [facility] in [place] for direct admission. I have 

discussed this with the patient and his wife and they are in agreement with the 

plans for transfer.








Departure





- Departure


Time of Disposition: 02:30


Disposition: DC/Tfer to Acute Hospital 02


Condition: Fair (Guarded)


Clinical Impression: 


 Acute decompensated heart failure, Supratherapeutic INR





Respiratory failure with hypoxia


Qualifiers:


 Chronicity: acute Qualified Code(s): J96.01 - Acute respiratory failure with 

hypoxia





Atrial fibrillation


Qualifiers:


 Atrial fibrillation type: unspecified persistent Qualified Code(s): I48.19 - 

Other persistent atrial fibrillation; I48.1 - Persistent atrial fibrillation





COPD (chronic obstructive pulmonary disease)


Qualifiers:


 COPD type: unspecified COPD Qualified Code(s): J44.9 - Chronic obstructive 

pulmonary disease, unspecified








- Discharge Information


Referrals: 


Wilson Burnette MD [Primary Care Provider] - 





- My Orders


Last 24 Hours: 


My Active Orders





10/14/19 00:22


Chest 1V Frontal [CR] Stat 


Sodium Chloride 0.9% [Saline Flush]   10 ml FLUSH ASDIRECTED PRN 


Peripheral IV Insertion Adult [OM.PC] Routine 


EKG 12 Lead [EK] Routine 





10/14/19 00:23


EKG Documentation Completion [RC] ASDIRECTED 





10/14/19 00:24


RT Aerosol Therapy [RC] ASDIRECTED 














- Assessment/Plan


Last 24 Hours: 


My Active Orders





10/14/19 00:22


Chest 1V Frontal [CR] Stat 


Sodium Chloride 0.9% [Saline Flush]   10 ml FLUSH ASDIRECTED PRN 


Peripheral IV Insertion Adult [OM.PC] Routine 


EKG 12 Lead [EK] Routine 





10/14/19 00:23


EKG Documentation Completion [RC] ASDIRECTED 





10/14/19 00:24


RT Aerosol Therapy [RC] ASDIRECTED

## 2019-11-29 ENCOUNTER — HOSPITAL ENCOUNTER (EMERGENCY)
Dept: HOSPITAL 7 - FB.ED | Age: 64
Discharge: HOME | End: 2019-11-29
Payer: MEDICARE

## 2019-11-29 VITALS — HEART RATE: 96 BPM | DIASTOLIC BLOOD PRESSURE: 63 MMHG | SYSTOLIC BLOOD PRESSURE: 104 MMHG

## 2019-11-29 DIAGNOSIS — E11.65: Primary | ICD-10-CM

## 2019-11-29 DIAGNOSIS — F17.210: ICD-10-CM

## 2019-11-29 DIAGNOSIS — Z79.82: ICD-10-CM

## 2019-11-29 DIAGNOSIS — Z88.5: ICD-10-CM

## 2019-11-29 DIAGNOSIS — I11.0: ICD-10-CM

## 2019-11-29 DIAGNOSIS — I48.91: ICD-10-CM

## 2019-11-29 DIAGNOSIS — I10: ICD-10-CM

## 2019-11-29 DIAGNOSIS — Z79.899: ICD-10-CM

## 2019-11-29 DIAGNOSIS — Z79.51: ICD-10-CM

## 2019-11-29 DIAGNOSIS — Z88.8: ICD-10-CM

## 2019-11-29 DIAGNOSIS — Z79.4: ICD-10-CM

## 2019-11-29 DIAGNOSIS — I25.2: ICD-10-CM

## 2019-11-29 DIAGNOSIS — I50.9: ICD-10-CM

## 2019-11-29 DIAGNOSIS — J44.9: ICD-10-CM

## 2019-11-29 PROCEDURE — 82962 GLUCOSE BLOOD TEST: CPT

## 2019-11-29 PROCEDURE — 99284 EMERGENCY DEPT VISIT MOD MDM: CPT

## 2019-11-29 NOTE — EDM.PDOC
ED HPI GENERAL MEDICAL PROBLEM





- General


Chief Complaint: Diabetic Complaint


Stated Complaint: HIGH BLOOD SUGAR


Time Seen by Provider: 11/29/19 19:10


Source of Information: Reports: Patient


History Limitations: Reports: No Limitations





- History of Present Illness


INITIAL COMMENTS - FREE TEXT/NARRATIVE: 





Patient presented to the ED because of high blood sugar. He is taking 

prednisone for the past 4 days and since then his BS has been more than 250's. 

His doctor adjusted his insulin degludec from 15 U SC daily to 30 U SC but 

still his BS is elevated. His reading tonight was more than 450. He c/o mild 

headache.





- Related Data


 Allergies











Allergy/AdvReac Type Severity Reaction Status Date / Time


 


morphine Allergy  Difficulty Verified 11/29/19 19:28





   Breathing  


 


nicotine [From Nicoderm CQ] Allergy  Rash Verified 11/29/19 19:28


 


varenicline tartrate Allergy  Hives Verified 11/29/19 19:28





[From Chantix]     











Home Meds: 


 Home Meds





Nitroglycerin [Nitrostat] 0.4 mg SL ASDIRECTED PRN 11/30/13 [History]


metFORMIN [Glucophage] 1,000 mg PO BIDM 11/30/13 [History]


atorvaSTATin [Lipitor] 20 mg PO DAILY 12/19/14 [History]


Glimepiride 4 mg PO DAILY 01/26/16 [History]


Aspirin [Ecotrin EC] 81 mg PO DAILY 10/14/19 [History]


Docusate Sodium [Stool Softener] 100 mg PO BID 10/14/19 [History]


Fluticasone/Vilanterol [Breo Ellipta 100-25 MCG Inhalation Kit] 1 puff INH 

DAILY 10/14/19 [History]


Insulin Degludec [Tresiba Flextouch U-100] 10 unit SQ DAILY 10/14/19 [History]


Alfuzosin [Uroxatral] 10 mg PO DAILY 11/29/19 [History]


Azithromycin 500 mg PO DAILY 11/29/19 [History]


Carvedilol [Coreg] 50 mg PO BID 11/29/19 [History]


Cyanocobalamin (Vitamin B-12) [Vitamin B-12] 250 mcg PO DAILY 11/29/19 [History]


Finasteride 5 mg PO DAILY 11/29/19 [History]


Furosemide [Lasix] 80 mg PO DAILY 11/29/19 [History]


Potassium Chloride [Klor-Con] 20 meq PO BID 11/29/19 [History]


Pregabalin [Lyrica] 75 mg PO BID 11/29/19 [History]


Thiamine [Vitamin B-1] 100 mg PO DAILY 11/29/19 [History]











Past Medical History


HEENT History: Reports: Impaired Vision


Cardiovascular History: Reports: Afib, Heart Failure, High Cholesterol, 

Hypertension, MI, Stents


Other Cardiovascular History: 3 STENTS, MI x 2, cardioversion x 2


Respiratory History: Reports: COPD


Musculoskeletal History: Reports: Back Pain, Chronic


Neurological History: Reports: Neuropathy, Diabetic


Psychiatric History: Reports: Addiction


Other Psychiatric History: hx ETOH abuse, has been hospitalized for ETOH abuse


Endocrine/Metabolic History: Reports: Diabetes, Type II





- Infectious Disease History


Infectious Disease History: Reports: Chicken Pox, Measles





- Past Surgical History


Cardiovascular Surgical History: Reports: Coronary Artery Stent, Percutaneous 

Transluminal Angioplasty


Neurological Surgical History: Reports: Spinal Fusion, Other (See Below)


Musculoskeletal Surgical History: Reports: Other (See Below)





Social & Family History





- Family History


Family Medical History: Noncontributory





- Tobacco Use


Smoking Status *Q: Current Every Day Smoker


Years of Tobacco use: 53


Packs/Tins Daily: 0.5





- Caffeine Use


Caffeine Use: Reports: Coffee, Soda





- Recreational Drug Use


Recreational Drug Use: No





- Living Situation & Occupation


Living situation: Reports: , with Spouse


Occupation: Retired (Retired )





ED ROS GENERAL





- Review of Systems


Review Of Systems: See Below


Constitutional: Reports: No Symptoms


HEENT: Reports: No Symptoms


Respiratory: Reports: No Symptoms


Cardiovascular: Reports: No Symptoms


Endocrine: Reports: No Symptoms


GI/Abdominal: Reports: No Symptoms


: Reports: No Symptoms


Musculoskeletal: Reports: No Symptoms


Skin: Reports: No Symptoms


Neurological: Reports: No Symptoms


Psychiatric: Reports: No Symptoms





ED EXAM GENERAL NO PERIP PULSE





- Physical Exam


Exam: See Below


Exam Limited By: No Limitations


General Appearance: Alert, WD/WN, No Apparent Distress


Ears: Normal External Exam, Normal Canal, Normal TMs


Nose: Normal Inspection, Normal Mucosa, No Blood


Throat/Mouth: Normal Inspection, Normal Lips, Normal Teeth


Head: Atraumatic, Normocephalic


Neck: Normal Inspection, Supple, Non-Tender, Full Range of Motion


Cardiovascular: Normal Peripheral Pulses, Regular Rate, Rhythm, No Edema, No 

Gallop, No JVD, No Murmur, No Rub


GI/Abdominal: Normal Bowel Sounds, Soft, Non-Tender, No Organomegaly, No 

Distention


Back Exam: Normal Inspection, Full Range of Motion


Extremities: Normal Inspection, Normal Range of Motion, Non-Tender, No Pedal 

Edema, Normal Capillary Refill


Neurological: Alert, Oriented, CN II-XII Intact, Normal Cognition





Course





- Vital Signs


Text/Narrative:: 





accu check BS @ 1945 more than 450


upon discharge 326.


Last Recorded V/S: 


 Last Vital Signs











Temp  36.6 C   11/29/19 20:38


 


Pulse  96   11/29/19 20:38


 


Resp  18   11/29/19 20:38


 


BP  104/63   11/29/19 20:38


 


Pulse Ox  96   11/29/19 20:38














- Orders/Labs/Meds


Orders: 


 Active Orders 24 hr











 Category Date Time Status


 


 Accu Check [Blood Glucose Check, Bedside] [RC] ONETIME Care  11/29/19 19:29 

Active


 


 Blood Glucose Check, Bedside [RC] ONETIME Care  11/29/19 20:45 Active











Meds: 


Medications














Discontinued Medications














Generic Name Dose Route Start Last Admin





  Trade Name Shanika  PRN Reason Stop Dose Admin


 


Insulin Human Regular  30 unit  11/30/19 19:22  





  Humulin R  SUBCUT  11/30/19 19:23  





  ONETIME ONE   





     





     





     





     


 


Insulin Human Regular  30 unit  11/29/19 19:33  11/29/19 19:37





  Humulin R  SUBCUT  11/29/19 19:34  30 units





  ONETIME STA   Administration





     





     





     





     














Departure





- Departure


Time of Disposition: 20:55


Disposition: Home, Self-Care 01


Condition: Good


Clinical Impression: 


 Hyperglycemia








- Discharge Information


Instructions:  Hyperglycemia


Referrals: 


Wilson Burnette MD [Primary Care Provider] - 


Forms:  ED Department Discharge


Additional Instructions: 


please read discharge instructions on hyperglycemia(high blood sugar)


Increase your degludec insulin to 35 U SC daily





- My Orders


Last 24 Hours: 


My Active Orders





11/29/19 19:29


Accu Check [Blood Glucose Check, Bedside] [RC] ONETIME 














- Assessment/Plan


Last 24 Hours: 


My Active Orders





11/29/19 19:29


Accu Check [Blood Glucose Check, Bedside] [RC] ONETIME